# Patient Record
Sex: MALE
[De-identification: names, ages, dates, MRNs, and addresses within clinical notes are randomized per-mention and may not be internally consistent; named-entity substitution may affect disease eponyms.]

---

## 2017-04-20 ENCOUNTER — APPOINTMENT (OUTPATIENT)
Dept: UROLOGY | Facility: CLINIC | Age: 72
End: 2017-04-20

## 2017-04-20 VITALS
WEIGHT: 185 LBS | TEMPERATURE: 97.5 F | BODY MASS INDEX: 25.06 KG/M2 | HEART RATE: 58 BPM | DIASTOLIC BLOOD PRESSURE: 82 MMHG | SYSTOLIC BLOOD PRESSURE: 146 MMHG | HEIGHT: 72 IN

## 2017-06-16 ENCOUNTER — RX RENEWAL (OUTPATIENT)
Age: 72
End: 2017-06-16

## 2017-08-15 ENCOUNTER — RX RENEWAL (OUTPATIENT)
Age: 72
End: 2017-08-15

## 2017-08-15 RX ORDER — NYSTATIN AND TRIAMCINOLONE ACETONIDE 100000; 1 MG/G; MG/G
100000-0.1 CREAM TOPICAL
Qty: 60 | Refills: 0 | Status: COMPLETED | COMMUNITY
Start: 2016-10-26

## 2017-10-17 ENCOUNTER — APPOINTMENT (OUTPATIENT)
Dept: UROLOGY | Facility: CLINIC | Age: 72
End: 2017-10-17
Payer: MEDICARE

## 2017-10-17 VITALS — SYSTOLIC BLOOD PRESSURE: 158 MMHG | DIASTOLIC BLOOD PRESSURE: 79 MMHG | HEART RATE: 54 BPM | TEMPERATURE: 97.3 F

## 2017-10-17 PROCEDURE — 99214 OFFICE O/P EST MOD 30 MIN: CPT

## 2017-10-20 ENCOUNTER — MOBILE ON CALL (OUTPATIENT)
Age: 72
End: 2017-10-20

## 2017-10-21 LAB
BILIRUB UR QL STRIP: NEGATIVE
CLARITY UR: CLEAR
COLLECTION METHOD: NORMAL
GLUCOSE UR-MCNC: NEGATIVE
HCG UR QL: 0.2 EU/DL
HGB UR QL STRIP.AUTO: NORMAL
KETONES UR-MCNC: NEGATIVE
LEUKOCYTE ESTERASE UR QL STRIP: NEGATIVE
NITRITE UR QL STRIP: NEGATIVE
PH UR STRIP: 5
PROT UR STRIP-MCNC: NEGATIVE
SP GR UR STRIP: >=1.03

## 2018-05-01 ENCOUNTER — APPOINTMENT (OUTPATIENT)
Dept: UROLOGY | Facility: CLINIC | Age: 73
End: 2018-05-01
Payer: MEDICARE

## 2018-05-01 VITALS
OXYGEN SATURATION: 99 % | HEART RATE: 88 BPM | HEIGHT: 72 IN | BODY MASS INDEX: 25.73 KG/M2 | WEIGHT: 190 LBS | SYSTOLIC BLOOD PRESSURE: 126 MMHG | DIASTOLIC BLOOD PRESSURE: 69 MMHG

## 2018-05-01 PROCEDURE — 99214 OFFICE O/P EST MOD 30 MIN: CPT

## 2018-05-23 ENCOUNTER — RX RENEWAL (OUTPATIENT)
Age: 73
End: 2018-05-23

## 2018-05-23 ENCOUNTER — MEDICATION RENEWAL (OUTPATIENT)
Age: 73
End: 2018-05-23

## 2018-11-29 ENCOUNTER — APPOINTMENT (OUTPATIENT)
Dept: UROLOGY | Facility: CLINIC | Age: 73
End: 2018-11-29
Payer: MEDICARE

## 2018-11-29 VITALS
HEIGHT: 72 IN | HEART RATE: 58 BPM | WEIGHT: 190 LBS | DIASTOLIC BLOOD PRESSURE: 86 MMHG | SYSTOLIC BLOOD PRESSURE: 152 MMHG | BODY MASS INDEX: 25.73 KG/M2

## 2018-11-29 PROCEDURE — 99214 OFFICE O/P EST MOD 30 MIN: CPT

## 2019-06-19 ENCOUNTER — APPOINTMENT (OUTPATIENT)
Dept: UROLOGY | Facility: CLINIC | Age: 74
End: 2019-06-19
Payer: MEDICARE

## 2019-06-19 VITALS
BODY MASS INDEX: 25.73 KG/M2 | DIASTOLIC BLOOD PRESSURE: 78 MMHG | HEART RATE: 61 BPM | SYSTOLIC BLOOD PRESSURE: 145 MMHG | WEIGHT: 190 LBS | OXYGEN SATURATION: 98 % | HEIGHT: 72 IN

## 2019-06-19 PROCEDURE — 99214 OFFICE O/P EST MOD 30 MIN: CPT

## 2019-06-19 NOTE — HISTORY OF PRESENT ILLNESS
[FreeTextEntry1] : The patient is a 73-year-old gentleman who was seen in the office today for the above. He is presently on tamsulosin one capsule.He discontinued the finasteride and his libido is returned to normal. His daytime frequency Is 5 times a day with nocturia x1. He occasionally has intermittency and difficulty starting his stream but this is tolerable. He denies all other urological and constitutional symptomatology.\par \par His past medical history, surgical history, medication history and allergy history are unchanged.

## 2019-06-19 NOTE — PHYSICAL EXAM
[General Appearance - Well Developed] : well developed [General Appearance - Well Nourished] : well nourished [Normal Appearance] : normal appearance [Well Groomed] : well groomed [General Appearance - In No Acute Distress] : no acute distress [Bowel Sounds] : normal bowel sounds [Abdomen Soft] : soft [Abdomen Tenderness] : non-tender [Abdomen Mass (___ Cm)] : no abdominal mass palpated [Costovertebral Angle Tenderness] : no ~M costovertebral angle tenderness [Anus Abnormality] : the anus and perineum were normal [Prostate Tenderness] : the prostate was not tender [Rectal Exam - Rectum] : digital rectal exam was normal [Prostate Size ___ gm] : prostate size [unfilled] gm [No Prostate Nodules] : no prostate nodules [] : no rash [Oriented To Time, Place, And Person] : oriented to person, place, and time [Edema] : no peripheral edema [Mood] : the mood was normal [Not Anxious] : not anxious [Affect] : the affect was normal [Normal Station and Gait] : the gait and station were normal for the patient's age [No Focal Deficits] : no focal deficits

## 2019-06-19 NOTE — ASSESSMENT
[FreeTextEntry1] : #1) elevated PSA\par 4/11/17 PSA 3.1\par 10/6/17 PSA 3.3\par 4/6/18 PSA 4.5-patient is uncertain if he ejaculated around the time of this blood determination \par 11/5/18 PSA 3.0\par PSA to be done in 11/19- paper work given.\par \par He'll return in 6 months for reevaluation.\par \par #2) symptomatic obstructive BPH: Continue tamsulosin one capsule-refill given;

## 2019-11-19 ENCOUNTER — APPOINTMENT (OUTPATIENT)
Dept: UROLOGY | Facility: CLINIC | Age: 74
End: 2019-11-19
Payer: MEDICARE

## 2019-11-19 VITALS
OXYGEN SATURATION: 99 % | DIASTOLIC BLOOD PRESSURE: 78 MMHG | HEART RATE: 97 BPM | SYSTOLIC BLOOD PRESSURE: 171 MMHG | RESPIRATION RATE: 16 BRPM

## 2019-11-19 PROCEDURE — 99214 OFFICE O/P EST MOD 30 MIN: CPT

## 2019-11-19 NOTE — LETTER BODY
[Dear  ___] : Dear ~NISHANT, [Courtesy Letter:] : I had the pleasure of seeing your patient, [unfilled], in my office today. [Please see my note below.] : Please see my note below. [Sincerely,] : Sincerely, [Consult Closing:] : Thank you very much for allowing me to participate in the care of this patient.  If you have any questions, please do not hesitate to contact me.

## 2019-11-19 NOTE — PHYSICAL EXAM
[General Appearance - Well Nourished] : well nourished [General Appearance - Well Developed] : well developed [Normal Appearance] : normal appearance [Well Groomed] : well groomed [General Appearance - In No Acute Distress] : no acute distress [Abdomen Soft] : soft [Bowel Sounds] : normal bowel sounds [Abdomen Tenderness] : non-tender [Abdomen Mass (___ Cm)] : no abdominal mass palpated [Anus Abnormality] : the anus and perineum were normal [Costovertebral Angle Tenderness] : no ~M costovertebral angle tenderness [Rectal Exam - Rectum] : digital rectal exam was normal [Prostate Tenderness] : the prostate was not tender [No Prostate Nodules] : no prostate nodules [Prostate Size ___ gm] : prostate size [unfilled] gm [] : no rash [Edema] : no peripheral edema [Oriented To Time, Place, And Person] : oriented to person, place, and time [Mood] : the mood was normal [Affect] : the affect was normal [Normal Station and Gait] : the gait and station were normal for the patient's age [Not Anxious] : not anxious [No Focal Deficits] : no focal deficits

## 2019-11-19 NOTE — ASSESSMENT
[FreeTextEntry1] : #1) elevated PSA\par 4/11/17 PSA 3.1\par 10/6/17 PSA 3.3\par 4/6/18 PSA 4.5-patient is uncertain if he ejaculated around the time of this blood determination \par 11/5/18 PSA 3.0\par 11/5/19 PSA 8.0-patient discontinued finasteride prior to this because of decreased libido.\par PSA will be repeated next week and if still in this range, a transperitoneal biopsy will be arranged with Dr. Tai Salas.He was requested to call the office for the results.\par \par He'll return in 6 months with a PSA for reevaluation.\par \par #2) symptomatic obstructive BPH:substitute alfuzosin for tamsulosin.\par \par #3) Erectile dysfunction: Discontinue Cialis Since he does not needed any more. He was told he cannot take it if he takes alfuzosin.

## 2019-12-03 ENCOUNTER — TRANSCRIPTION ENCOUNTER (OUTPATIENT)
Age: 74
End: 2019-12-03

## 2020-01-08 ENCOUNTER — APPOINTMENT (OUTPATIENT)
Dept: UROLOGY | Facility: CLINIC | Age: 75
End: 2020-01-08
Payer: MEDICARE

## 2020-01-08 PROCEDURE — 99214 OFFICE O/P EST MOD 30 MIN: CPT

## 2020-01-08 NOTE — HISTORY OF PRESENT ILLNESS
[FreeTextEntry1] : The patient is a 74-year-old gentleman who was seen with his wife noticed today for the above. He has no new urological or constitutional symptomatology.\par \par Past medical history, surgical history, medication history and allergy history are unchanged.

## 2020-01-08 NOTE — ASSESSMENT
[FreeTextEntry1] : #1) elevated PSA\par 4/11/17 PSA 3.1\par 10/6/17 PSA 3.3\par 4/6/18 PSA 4.5-patient is uncertain if he ejaculated around the time of this blood determination \par 11/5/18 PSA 3.0\par 11/5/19 PSA 8.0-patient discontinued finasteride prior to this because of decreased libido.\par 12/13/19 PSA 7.0\par The patient was referred to Dr. Tai Salas for transperineal prostate biopsy.\par \par #2) symptomatic obstructive BPH:substitute alfuzosin for tamsulosin.\par \par #3) Erectile dysfunction: Discontinue Cialis Since he does not needed any more. He was told he cannot take it if he takes alfuzosin.

## 2020-01-08 NOTE — PHYSICAL EXAM
[General Appearance - Well Developed] : well developed [General Appearance - Well Nourished] : well nourished [General Appearance - In No Acute Distress] : no acute distress [Well Groomed] : well groomed [Normal Appearance] : normal appearance [] : no rash [Oriented To Time, Place, And Person] : oriented to person, place, and time [Affect] : the affect was normal [Mood] : the mood was normal [Normal Station and Gait] : the gait and station were normal for the patient's age [Not Anxious] : not anxious [No Focal Deficits] : no focal deficits

## 2020-01-28 ENCOUNTER — APPOINTMENT (OUTPATIENT)
Dept: UROLOGY | Facility: CLINIC | Age: 75
End: 2020-01-28
Payer: MEDICARE

## 2020-01-28 VITALS
HEIGHT: 72 IN | BODY MASS INDEX: 23.3 KG/M2 | HEART RATE: 71 BPM | SYSTOLIC BLOOD PRESSURE: 171 MMHG | TEMPERATURE: 97.9 F | WEIGHT: 172 LBS | DIASTOLIC BLOOD PRESSURE: 79 MMHG

## 2020-01-28 PROCEDURE — 99214 OFFICE O/P EST MOD 30 MIN: CPT

## 2020-01-30 ENCOUNTER — FORM ENCOUNTER (OUTPATIENT)
Age: 75
End: 2020-01-30

## 2020-01-31 ENCOUNTER — APPOINTMENT (OUTPATIENT)
Dept: UROLOGY | Facility: CLINIC | Age: 75
End: 2020-01-31

## 2020-01-31 ENCOUNTER — APPOINTMENT (OUTPATIENT)
Dept: MRI IMAGING | Facility: CLINIC | Age: 75
End: 2020-01-31
Payer: MEDICARE

## 2020-01-31 ENCOUNTER — OUTPATIENT (OUTPATIENT)
Dept: OUTPATIENT SERVICES | Facility: HOSPITAL | Age: 75
LOS: 1 days | End: 2020-01-31
Payer: MEDICARE

## 2020-01-31 DIAGNOSIS — R97.20 ELEVATED PROSTATE SPECIFIC ANTIGEN [PSA]: ICD-10-CM

## 2020-01-31 PROCEDURE — 72197 MRI PELVIS W/O & W/DYE: CPT | Mod: 26

## 2020-01-31 PROCEDURE — A9585: CPT

## 2020-01-31 PROCEDURE — 72197 MRI PELVIS W/O & W/DYE: CPT

## 2020-06-24 ENCOUNTER — RX RENEWAL (OUTPATIENT)
Age: 75
End: 2020-06-24

## 2020-07-29 ENCOUNTER — APPOINTMENT (OUTPATIENT)
Dept: UROLOGY | Facility: CLINIC | Age: 75
End: 2020-07-29
Payer: MEDICARE

## 2020-07-29 VITALS
BODY MASS INDEX: 23.57 KG/M2 | HEART RATE: 66 BPM | DIASTOLIC BLOOD PRESSURE: 85 MMHG | HEIGHT: 72 IN | SYSTOLIC BLOOD PRESSURE: 152 MMHG | WEIGHT: 174 LBS

## 2020-07-29 VITALS — TEMPERATURE: 97.6 F

## 2020-07-29 PROCEDURE — 99214 OFFICE O/P EST MOD 30 MIN: CPT

## 2020-07-29 NOTE — ASSESSMENT
[FreeTextEntry1] : #1) elevated PSA\par 4/11/17 PSA 3.1\par 10/6/17 PSA 3.3\par 4/6/18 PSA 4.5-patient is uncertain if he ejaculated around the time of this blood determination \par 11/5/18 PSA 3.0\par 11/5/19 PSA 8.0-patient discontinued finasteride prior to this because of decreased libido.\par 12/13/19 PSA 7.0\par The patient was referred to Dr. Tai Salas for transperineal prostate biopsy. Dr. Salas ordered an MRI of the prostate and this was read as a PIRADS 2 so he did not recommend biopsy at this time and just suggested continuing with the PSA screening.\par 7/6/20 PSA 7.9; percent free PSA 14%;prostate measured 74 cc on MRI On 1/31/20\par \par #2) symptomatic obstructive BPH:substitute alfuzosin for tamsulosin.\par \par #3) Erectile dysfunction: Discontinue Cialis Since he does not needed any more. He was told he cannot take it if he takes alfuzosin.

## 2020-07-29 NOTE — PHYSICAL EXAM
[General Appearance - Well Developed] : well developed [General Appearance - Well Nourished] : well nourished [Normal Appearance] : normal appearance [Well Groomed] : well groomed [] : no rash [General Appearance - In No Acute Distress] : no acute distress [Oriented To Time, Place, And Person] : oriented to person, place, and time [Affect] : the affect was normal [Mood] : the mood was normal [Not Anxious] : not anxious [Normal Station and Gait] : the gait and station were normal for the patient's age [No Focal Deficits] : no focal deficits

## 2021-01-27 ENCOUNTER — APPOINTMENT (OUTPATIENT)
Dept: UROLOGY | Facility: CLINIC | Age: 76
End: 2021-01-27
Payer: MEDICARE

## 2021-01-27 VITALS — TEMPERATURE: 97.3 F

## 2021-01-27 PROCEDURE — 99213 OFFICE O/P EST LOW 20 MIN: CPT

## 2021-01-27 NOTE — HISTORY OF PRESENT ILLNESS
[FreeTextEntry1] : The patient is a 74-year-old gentleman who was seen with his wife noticed today for the above. He is presently on alfuzosin only.  His daytime frequency is 8 times a day with nocturia x1 and he denies all other urological and constitutional symptomatology.  He no longer needs medication for erectile dysfunction.  He has no new urological or constitutional symptomatology.\par \par Past medical history, surgical history, medication history and allergy history are unchanged.

## 2021-01-27 NOTE — ASSESSMENT
[FreeTextEntry1] : #1) elevated PSA\par 4/11/17 PSA 3.1\par 10/6/17 PSA 3.3\par 4/6/18 PSA 4.5-patient is uncertain if he ejaculated around the time of this blood determination \par 11/5/18 PSA 3.0\par 11/5/19 PSA 8.0-patient discontinued finasteride prior to this because of decreased libido.\par 12/13/19 PSA 7.0\par The patient was referred to Dr. Tai Salas for transperineal prostate biopsy. Dr. Salas ordered an MRI of the prostate and this was read as a PIRADS 2 so he did not recommend biopsy at this time and just suggested continuing with the PSA screening.\par 7/6/20 PSA 7.9; percent free PSA 14%;prostate measured 74 cc on MRI On 1/31/20\par 1/19/2021 PSA 8.4; percent free PSA 20%; PSA density 0.11\par RTO 6 months with PSA\par \par #2) symptomatic obstructive BPH:substitute alfuzosin for tamsulosin.\par \par #3) Erectile dysfunction: Discontinue Cialis Since he does not needed any more.

## 2021-01-27 NOTE — PHYSICAL EXAM
[General Appearance - Well Developed] : well developed [General Appearance - Well Nourished] : well nourished [Normal Appearance] : normal appearance [Well Groomed] : well groomed [General Appearance - In No Acute Distress] : no acute distress [Bowel Sounds] : normal bowel sounds [Abdomen Soft] : soft [Abdomen Tenderness] : non-tender [Abdomen Mass (___ Cm)] : no abdominal mass palpated [Costovertebral Angle Tenderness] : no ~M costovertebral angle tenderness [Anus Abnormality] : the anus and perineum were normal [Prostate Tenderness] : the prostate was not tender [Rectal Exam - Rectum] : digital rectal exam was normal [No Prostate Nodules] : no prostate nodules [Prostate Size ___ gm] : prostate size [unfilled] gm [] : no rash [Edema] : no peripheral edema [Oriented To Time, Place, And Person] : oriented to person, place, and time [Affect] : the affect was normal [Mood] : the mood was normal [Not Anxious] : not anxious [Normal Station and Gait] : the gait and station were normal for the patient's age [No Focal Deficits] : no focal deficits

## 2021-08-17 ENCOUNTER — NON-APPOINTMENT (OUTPATIENT)
Age: 76
End: 2021-08-17

## 2021-09-15 ENCOUNTER — APPOINTMENT (OUTPATIENT)
Dept: UROLOGY | Facility: CLINIC | Age: 76
End: 2021-09-15

## 2021-09-15 NOTE — ASSESSMENT
[FreeTextEntry1] : #1) elevated PSA\par 4/11/17 PSA 3.1\par 10/6/17 PSA 3.3\par 4/6/18 PSA 4.5-patient is uncertain if he ejaculated around the time of this blood determination \par 11/5/18 PSA 3.0\par 11/5/19 PSA 8.0-patient discontinued finasteride prior to this because of decreased libido.\par 12/13/19 PSA 7.0\par The patient was referred to Dr. Tai Salas for transperineal prostate biopsy. Dr. Salas ordered an MRI of the prostate and this was read as a PIRADS 2 so he did not recommend biopsy at this time and just suggested continuing with the PSA screening.\par 7/6/20 PSA 7.9; percent free PSA 14%;prostate measured 74 cc on MRI On 1/31/20\par 1/19/2021 PSA 8.4; percent free PSA 20%; PSA density 0.11\par 8/9/2021 PSA 2.8-suspect this was a mistake.\par Repeat PSA next week-patient instructed to call the office for the result.\par RTO 6 months with PSA\par \par #2) symptomatic obstructive BPH:substitute alfuzosin for tamsulosin.\par \par #3) Erectile dysfunction: Discontinue Cialis Since he does not needed any more.

## 2021-09-15 NOTE — PHYSICAL EXAM
[General Appearance - Well Developed] : well developed [General Appearance - Well Nourished] : well nourished [Normal Appearance] : normal appearance [Well Groomed] : well groomed [General Appearance - In No Acute Distress] : no acute distress [Bowel Sounds] : normal bowel sounds [Abdomen Soft] : soft [Abdomen Tenderness] : non-tender [Abdomen Mass (___ Cm)] : no abdominal mass palpated [Costovertebral Angle Tenderness] : no ~M costovertebral angle tenderness [Anus Abnormality] : the anus and perineum were normal [Rectal Exam - Rectum] : digital rectal exam was normal [Prostate Tenderness] : the prostate was not tender [No Prostate Nodules] : no prostate nodules [Prostate Size ___ gm] : prostate size [unfilled] gm [] : no rash [Edema] : no peripheral edema [Oriented To Time, Place, And Person] : oriented to person, place, and time [Affect] : the affect was normal [Mood] : the mood was normal [Not Anxious] : not anxious [Normal Station and Gait] : the gait and station were normal for the patient's age [No Focal Deficits] : no focal deficits

## 2021-09-22 ENCOUNTER — APPOINTMENT (OUTPATIENT)
Dept: UROLOGY | Facility: CLINIC | Age: 76
End: 2021-09-22
Payer: MEDICARE

## 2021-09-22 VITALS — SYSTOLIC BLOOD PRESSURE: 129 MMHG | HEART RATE: 65 BPM | DIASTOLIC BLOOD PRESSURE: 67 MMHG | TEMPERATURE: 97.9 F

## 2021-09-22 PROCEDURE — 99214 OFFICE O/P EST MOD 30 MIN: CPT

## 2021-09-22 NOTE — ASSESSMENT
[FreeTextEntry1] : #1) elevated PSA\par 4/11/17 PSA 3.1\par 10/6/17 PSA 3.3\par 4/6/18 PSA 4.5-patient is uncertain if he ejaculated around the time of this blood determination \par 11/5/18 PSA 3.0\par 11/5/19 PSA 8.0-patient discontinued finasteride prior to this because of decreased libido.\par 12/13/19 PSA 7.0\par The patient was referred to Dr. Tai Salas for transperineal prostate biopsy. Dr. Salas ordered an MRI of the prostate and this was read as a PIRADS 2 so he did not recommend biopsy at this time and just suggested continuing with the PSA screening.\par 7/6/20 PSA 7.9; percent free PSA 14%;prostate measured 74 cc on MRI On 1/31/20\par 1/19/2021 PSA 8.4; percent free PSA 20%; PSA density 0.11\par 8/9/2021 PSA 2.8\par KISHORE will be repeated next week to confirm that the PSA is in the range of 2.8.  Will call the office for the results.\par RTO 6 months with PSA\par \par #2) symptomatic obstructive BPH:substitute alfuzosin for tamsulosin.\par \par #3) Erectile dysfunction: Discontinue Cialis Since he does not needed any more.

## 2021-10-13 ENCOUNTER — NON-APPOINTMENT (OUTPATIENT)
Age: 76
End: 2021-10-13

## 2022-03-24 ENCOUNTER — APPOINTMENT (OUTPATIENT)
Dept: UROLOGY | Facility: CLINIC | Age: 77
End: 2022-03-24
Payer: MEDICARE

## 2022-03-24 VITALS — HEART RATE: 75 BPM | SYSTOLIC BLOOD PRESSURE: 169 MMHG | TEMPERATURE: 97.7 F | DIASTOLIC BLOOD PRESSURE: 84 MMHG

## 2022-03-24 PROCEDURE — 99214 OFFICE O/P EST MOD 30 MIN: CPT

## 2022-03-24 RX ORDER — FINASTERIDE 5 MG/1
5 TABLET, FILM COATED ORAL DAILY
Qty: 90 | Refills: 2 | Status: DISCONTINUED | COMMUNITY
Start: 2017-10-17 | End: 2022-03-24

## 2022-03-24 RX ORDER — TAMSULOSIN HYDROCHLORIDE 0.4 MG/1
0.4 CAPSULE ORAL
Qty: 90 | Refills: 1 | Status: DISCONTINUED | COMMUNITY
Start: 2017-10-17 | End: 2022-03-24

## 2022-03-24 NOTE — LETTER BODY
[Dear  ___] : Dear  [unfilled], [Consult Letter:] : I had the pleasure of evaluating your patient, [unfilled]. [Please see my note below.] : Please see my note below. [Consult Closing:] : Thank you very much for allowing me to participate in the care of this patient.  If you have any questions, please do not hesitate to contact me. [Sincerely,] : Sincerely, [FreeTextEntry3] : Jackeline Lagunas, \par Genitourinary Medicine\par [facility]\par

## 2022-03-24 NOTE — ASSESSMENT
[FreeTextEntry1] : Elevated PSA:\par PSA is 7.16\par neg prostate biopsy 2020 and MRI PIRADS 2 when PSA was >7\par will recheck PSA in 3 months and prostate exam\par if rising will consider repeating MRI\par \par BPH: c/w alfuzosin\par Discussed cialis 5mg vs tamsulosin double dose\par pt wants to monitor his symptoms at this time. will consider changing if symptoms worsen in future

## 2022-03-24 NOTE — HISTORY OF PRESENT ILLNESS
[FreeTextEntry1] : 76yo M hx of BPH, Elevated PSA s/p neg transperineal biopsy in 2019\par \par Pt is currently taking alfuzosin. He still occasionally wake up 1-2x a night. He reports some difficulty starting his urination after he wakes up to urinate. \par \par 11/5/18 PSA 3.0\par 11/5/19 PSA 8.0-patient discontinued finasteride prior to this because of decreased libido.\par 12/13/19 PSA 7.0\par The patient was referred to Dr. Tai Salas for transperineal prostate biopsy. Dr. Salas ordered an MRI of the prostate and this was read as a PIRADS 2 so he did not recommend biopsy at this time and just suggested continuing with the PSA screening.\par 7/6/20 PSA 7.9; percent free PSA 14%;prostate measured 74 cc on MRI On 1/31/20\par 1/19/2021 PSA 8.4; percent free PSA 20%; PSA density 0.11\par 8/9/2021 PSA 2.8\par 9/2021 PSA 1.9 & 26% free\par PSA 7.16 on 2/2022

## 2022-06-23 ENCOUNTER — APPOINTMENT (OUTPATIENT)
Dept: UROLOGY | Facility: CLINIC | Age: 77
End: 2022-06-23
Payer: MEDICARE

## 2022-06-23 VITALS
TEMPERATURE: 98.1 F | DIASTOLIC BLOOD PRESSURE: 74 MMHG | WEIGHT: 175 LBS | BODY MASS INDEX: 23.7 KG/M2 | SYSTOLIC BLOOD PRESSURE: 134 MMHG | HEIGHT: 72 IN | HEART RATE: 73 BPM

## 2022-06-23 PROCEDURE — 99214 OFFICE O/P EST MOD 30 MIN: CPT

## 2022-06-23 NOTE — PHYSICAL EXAM
[General Appearance - Well Developed] : well developed [General Appearance - Well Nourished] : well nourished [Normal Appearance] : normal appearance [Well Groomed] : well groomed [General Appearance - In No Acute Distress] : no acute distress [Abdomen Soft] : soft [Prostate Tenderness] : the prostate was not tender [No Prostate Nodules] : no prostate nodules [Prostate Size ___ gm] : prostate size [unfilled] gm [Edema] : no peripheral edema [] : no respiratory distress [Respiration, Rhythm And Depth] : normal respiratory rhythm and effort [Exaggerated Use Of Accessory Muscles For Inspiration] : no accessory muscle use [Oriented To Time, Place, And Person] : oriented to person, place, and time [Affect] : the affect was normal [Mood] : the mood was normal [Not Anxious] : not anxious [Normal Station and Gait] : the gait and station were normal for the patient's age [No Focal Deficits] : no focal deficits

## 2022-06-23 NOTE — LETTER BODY
[Dear  ___] : Dear  [unfilled], [Consult Letter:] : I had the pleasure of evaluating your patient, [unfilled]. [Please see my note below.] : Please see my note below. [Consult Closing:] : Thank you very much for allowing me to participate in the care of this patient.  If you have any questions, please do not hesitate to contact me. [Sincerely,] : Sincerely, [FreeTextEntry3] : Jackeline Lagunas, \par Genitourinary Medicine\par [facility]\par

## 2022-06-23 NOTE — HISTORY OF PRESENT ILLNESS
[FreeTextEntry1] : 78yo M hx of BPH, Elevated PSA \par \par Pt is currently taking alfuzosin. He still occasionally wake up 1-2x a night. He reports some difficulty starting his urination after he wakes up to urinate. \par \par 11/5/18 PSA 3.0\par 11/5/19 PSA 8.0-patient discontinued finasteride prior to this because of decreased libido.\par 12/13/19 PSA 7.0\par The patient was referred to Dr. Tai Salas for transperineal prostate biopsy. Dr. Salas ordered an MRI of the prostate and this was read as a PIRADS 2 so he did not recommend biopsy at this time and just suggested continuing with the PSA screening.\par 7/6/20 PSA 7.9; percent free PSA 14%;prostate measured 74 cc on MRI On 1/31/20\par 1/19/2021 PSA 8.4; percent free PSA 20%; PSA density 0.11\par 8/9/2021 PSA 2.8\par 9/2021 PSA 1.9 & 26% free\par PSA 7.16 on 2/2022\par PSA 7.21 on 6/2022

## 2022-06-23 NOTE — ASSESSMENT
[FreeTextEntry1] : Elevated PSA:\par PSA is stable but elevated\par (error on previous note - patient did not have any previous biopsies)\par 1/2020: MRI PIRADS 2 when PSA was >7\par will repeat MRI in 6 months along with PSA\par \par BPH: c/w alfuzosin\par start cialis 5mg daily\par Discussed tamsulosin double dose instead of alfuzosin if symptoms doesn’t improve\par Pt has tried finasteride in past but stopped due to sexual side effects. Also discussed dutasteride\par \par ED:\par 5mg cialis

## 2022-06-27 ENCOUNTER — NON-APPOINTMENT (OUTPATIENT)
Age: 77
End: 2022-06-27

## 2022-11-14 ENCOUNTER — APPOINTMENT (OUTPATIENT)
Dept: MRI IMAGING | Facility: CLINIC | Age: 77
End: 2022-11-14

## 2022-11-14 ENCOUNTER — RESULT REVIEW (OUTPATIENT)
Age: 77
End: 2022-11-14

## 2022-11-14 ENCOUNTER — OUTPATIENT (OUTPATIENT)
Dept: OUTPATIENT SERVICES | Facility: HOSPITAL | Age: 77
LOS: 1 days | End: 2022-11-14
Payer: MEDICARE

## 2022-11-14 DIAGNOSIS — R97.20 ELEVATED PROSTATE SPECIFIC ANTIGEN [PSA]: ICD-10-CM

## 2022-11-14 PROCEDURE — 72197 MRI PELVIS W/O & W/DYE: CPT | Mod: 26,MH

## 2022-11-14 PROCEDURE — 76498P: CUSTOM | Mod: 26,MH

## 2022-11-14 PROCEDURE — 76498 UNLISTED MR PROCEDURE: CPT

## 2022-11-14 PROCEDURE — A9585: CPT

## 2022-11-14 PROCEDURE — 72197 MRI PELVIS W/O & W/DYE: CPT

## 2022-11-21 ENCOUNTER — NON-APPOINTMENT (OUTPATIENT)
Age: 77
End: 2022-11-21

## 2022-11-21 DIAGNOSIS — M17.11 UNILATERAL PRIMARY OSTEOARTHRITIS, RIGHT KNEE: ICD-10-CM

## 2022-11-21 DIAGNOSIS — Z87.438 PERSONAL HISTORY OF OTHER DISEASES OF MALE GENITAL ORGANS: ICD-10-CM

## 2022-11-21 DIAGNOSIS — M25.561 PAIN IN RIGHT KNEE: ICD-10-CM

## 2022-11-28 ENCOUNTER — APPOINTMENT (OUTPATIENT)
Dept: UROLOGY | Facility: CLINIC | Age: 77
End: 2022-11-28

## 2022-11-28 PROCEDURE — 99214 OFFICE O/P EST MOD 30 MIN: CPT

## 2022-11-28 NOTE — ASSESSMENT
[FreeTextEntry1] : Elevated PSA:\par rising PSA\par MRI showed PIRADS 3 lesion. \par recommended prostate biopsy. Pt has seen Dr. Salas in past for PIRADs 2. He prefers to see Dr. Salas for consultation for biopsy. \par \par BPH: c/w alfuzosin\par c/w cialis 5mg daily\par \par ED:\par can double 5mg cialis for ED.

## 2022-11-28 NOTE — LETTER BODY
[Dear  ___] : Dear  [unfilled], [Consult Letter:] : I had the pleasure of evaluating your patient, [unfilled]. [Please see my note below.] : Please see my note below. [Consult Closing:] : Thank you very much for allowing me to participate in the care of this patient.  If you have any questions, please do not hesitate to contact me. [Sincerely,] : Sincerely, [FreeTextEntry3] : Jackeline Lagunas, \par Genitourinary Medicine\par [facility]\par

## 2022-11-28 NOTE — PHYSICAL EXAM
[General Appearance - Well Developed] : well developed [General Appearance - Well Nourished] : well nourished [Normal Appearance] : normal appearance [Well Groomed] : well groomed [General Appearance - In No Acute Distress] : no acute distress [Abdomen Soft] : soft [Prostate Size ___ gm] : prostate size [unfilled] gm [Edema] : no peripheral edema [] : no respiratory distress [Respiration, Rhythm And Depth] : normal respiratory rhythm and effort [Exaggerated Use Of Accessory Muscles For Inspiration] : no accessory muscle use [Oriented To Time, Place, And Person] : oriented to person, place, and time [Affect] : the affect was normal [Mood] : the mood was normal [Not Anxious] : not anxious [Normal Station and Gait] : the gait and station were normal for the patient's age [No Focal Deficits] : no focal deficits

## 2022-11-28 NOTE — HISTORY OF PRESENT ILLNESS
[FreeTextEntry1] : 76yo M hx of BPH, Elevated PSA \par He is taking alfuzosin and cialis 5mg daily. He reports since starting cialis he noticed improvement in urination. \par \par 11/5/18 PSA 3.0\par 11/5/19 PSA 8.0-patient discontinued finasteride prior to this because of decreased libido.\par 12/13/19 PSA 7.0\par The patient was referred to Dr. Tai Salas for transperineal prostate biopsy. Dr. Salas ordered an MRI of the prostate and this was read as a PIRADS 2 so he did not recommend biopsy at this time and just suggested continuing with the PSA screening.\par 7/6/20 PSA 7.9; percent free PSA 14%;prostate measured 74 cc on MRI On 1/31/20\par 1/19/2021 PSA 8.4; percent free PSA 20%; PSA density 0.11\par 8/9/2021 PSA 2.8\par 9/2021 PSA 1.9 & 26% free\par PSA 7.16 on 2/2022\par PSA 7.21 on 6/2022\par PSA 8.2 total & 24% free on 11/2022\par MRI prostate showed one PIRADS 3 lesion.

## 2022-12-14 ENCOUNTER — APPOINTMENT (OUTPATIENT)
Dept: UROLOGY | Facility: CLINIC | Age: 77
End: 2022-12-14

## 2022-12-14 VITALS
HEIGHT: 72 IN | DIASTOLIC BLOOD PRESSURE: 90 MMHG | BODY MASS INDEX: 23.7 KG/M2 | HEART RATE: 71 BPM | TEMPERATURE: 98 F | RESPIRATION RATE: 17 BRPM | SYSTOLIC BLOOD PRESSURE: 162 MMHG | WEIGHT: 175 LBS

## 2022-12-14 PROCEDURE — 99214 OFFICE O/P EST MOD 30 MIN: CPT

## 2022-12-14 RX ORDER — TADALAFIL 20 MG/1
20 TABLET, FILM COATED ORAL
Qty: 6 | Refills: 3 | Status: COMPLETED | COMMUNITY
Start: 2017-10-17 | End: 2022-12-14

## 2022-12-14 RX ORDER — GABAPENTIN 600 MG/1
600 TABLET, COATED ORAL DAILY
Refills: 0 | Status: COMPLETED | COMMUNITY
End: 2022-12-14

## 2022-12-14 RX ORDER — TADALAFIL 20 MG/1
20 TABLET, FILM COATED ORAL
Qty: 6 | Refills: 3 | Status: COMPLETED | COMMUNITY
Start: 2017-04-20 | End: 2022-12-14

## 2022-12-14 RX ORDER — DIAZEPAM 5 MG/1
5 TABLET ORAL
Qty: 1 | Refills: 0 | Status: COMPLETED | COMMUNITY
Start: 2020-01-27 | End: 2022-12-14

## 2022-12-15 ENCOUNTER — OFFICE (OUTPATIENT)
Dept: URBAN - METROPOLITAN AREA CLINIC 12 | Facility: CLINIC | Age: 77
Setting detail: OPHTHALMOLOGY
End: 2022-12-15
Payer: MEDICARE

## 2022-12-15 DIAGNOSIS — H25.12: ICD-10-CM

## 2022-12-15 DIAGNOSIS — H25.13: ICD-10-CM

## 2022-12-15 PROCEDURE — 92136 OPHTHALMIC BIOMETRY: CPT | Performed by: OPHTHALMOLOGY

## 2022-12-15 PROCEDURE — 99213 OFFICE O/P EST LOW 20 MIN: CPT | Performed by: OPHTHALMOLOGY

## 2022-12-15 ASSESSMENT — REFRACTION_CURRENTRX
OD_VPRISM_DIRECTION: SV
OS_OVR_VA: 20/
OD_SPHERE: -1.25
OD_OVR_VA: 20/
OS_SPHERE: -1.00
OS_VPRISM_DIRECTION: SV
OD_CYLINDER: SPHERE
OS_CYLINDER: SPHERE
OD_AXIS: 0
OS_AXIS: 0

## 2022-12-15 ASSESSMENT — SPHEQUIV_DERIVED
OS_SPHEQUIV: -0.625
OD_SPHEQUIV: -0.75
OD_SPHEQUIV: -0.5
OS_SPHEQUIV: -0.625

## 2022-12-15 ASSESSMENT — REFRACTION_AUTOREFRACTION
OD_SPHERE: -0.25
OS_SPHERE: -0.50
OS_AXIS: 033
OD_CYLINDER: -0.50
OS_CYLINDER: -0.25
OD_AXIS: 096

## 2022-12-15 ASSESSMENT — AXIALLENGTH_DERIVED
OD_AL: 23.202
OD_AL: 23.1081
OS_AL: 2.94
OS_AL: 2.94

## 2022-12-15 ASSESSMENT — KERATOMETRY
METHOD_AUTO_MANUAL: AUTO
OS_AXISANGLE_DEGREES: 089
OD_K1POWER_DIOPTERS: 45.25
OS_K2POWER_DIOPTERS: 945.75
OD_K2POWER_DIOPTERS: 45.50
OS_K1POWER_DIOPTERS: 45.54
OD_AXISANGLE_DEGREES: 162

## 2022-12-15 ASSESSMENT — REFRACTION_MANIFEST
OD_AXIS: 075
OD_VA1: 20/NI
OD_CYLINDER: -0.50
OS_VA1: 20/25
OS_SPHERE: -0.50
OS_CYLINDER: -0.25
OD_SPHERE: -0.50
OS_AXIS: 005

## 2022-12-15 ASSESSMENT — TONOMETRY
OD_IOP_MMHG: 14
OS_IOP_MMHG: 17

## 2022-12-15 ASSESSMENT — CONFRONTATIONAL VISUAL FIELD TEST (CVF)
OD_FINDINGS: FULL
OS_FINDINGS: FULL

## 2022-12-15 ASSESSMENT — VISUAL ACUITY
OS_BCVA: 20/30-2
OD_BCVA: 20/25

## 2022-12-16 NOTE — ASSESSMENT
[FreeTextEntry1] : I reviewed the patient's PSA level with him along with his PSA density and prostate MRI findings.  I counseled him that in the setting of having a PI-RADS category 3 lesion and low PSA density, I typically recommend against prostate biopsy in the setting as the likelihood of him harboring clinically significant prostate cancer is actually extremely low.  We reviewed how MRI is not without its error rate and that it is possible he may harbor clinically significant prostate cancer despite what appears to be a relatively low risk profile.  With these ideas in mind, we reviewed the decision of prostate biopsy.  I gave him my recommendation that I did not think it was necessary.  He was strongly in favor of not moving forward with biopsy at this time and is comfortable with this decision.  I would also recommend that he can discontinue future prostate cancer screening.

## 2022-12-16 NOTE — HISTORY OF PRESENT ILLNESS
[FreeTextEntry1] : Jac Galeana returns to the office today.  He is a 77-year-old man here today to consider a possible prostate biopsy.  I had seen him previously regarding a similar decision at the time when he had undergone a prostate MRI showing a PI-RADS category 2 lesion without any clearly suspicious lesions.  He has recently undergone repeat PSA testing and prostate MRI.  His total PSA is 8.2 ng/mL and the free fraction is 24%.  This level was from mid November this year.  Repeat prostate MRI was performed.  This showed a prostate volume of 93 cm³.  His calculated PSA density with this in mind is 0.08.  MRI also shows the presence of a 4 x 5 x 6 mm PI-RADS category 3 lesion at the left posterior medial peripheral zone mid gland to apex.  This prompted his referral here for consideration of prostate biopsy.

## 2023-01-05 ENCOUNTER — OFFICE (OUTPATIENT)
Dept: URBAN - METROPOLITAN AREA CLINIC 12 | Facility: CLINIC | Age: 78
Setting detail: OPHTHALMOLOGY
End: 2023-01-05
Payer: MEDICARE

## 2023-01-05 DIAGNOSIS — Z20.822: ICD-10-CM

## 2023-01-05 DIAGNOSIS — Z01.812: ICD-10-CM

## 2023-01-05 PROCEDURE — 99211 OFF/OP EST MAY X REQ PHY/QHP: CPT | Performed by: OPHTHALMOLOGY

## 2023-01-05 ASSESSMENT — SPHEQUIV_DERIVED
OS_SPHEQUIV: -0.625
OD_SPHEQUIV: -0.75
OS_SPHEQUIV: -0.625
OD_SPHEQUIV: -0.5

## 2023-01-05 ASSESSMENT — VISUAL ACUITY
OS_BCVA: 20/30-2
OD_BCVA: 20/25

## 2023-01-05 ASSESSMENT — KERATOMETRY
OS_K2POWER_DIOPTERS: 945.75
OD_K1POWER_DIOPTERS: 45.25
OD_K2POWER_DIOPTERS: 45.50
OD_AXISANGLE_DEGREES: 162
OS_AXISANGLE_DEGREES: 089
METHOD_AUTO_MANUAL: AUTO
OS_K1POWER_DIOPTERS: 45.54

## 2023-01-05 ASSESSMENT — REFRACTION_AUTOREFRACTION
OD_AXIS: 096
OS_AXIS: 033
OS_CYLINDER: -0.25
OD_CYLINDER: -0.50
OS_SPHERE: -0.50
OD_SPHERE: -0.25

## 2023-01-05 ASSESSMENT — REFRACTION_MANIFEST
OS_SPHERE: -0.50
OD_AXIS: 075
OS_AXIS: 005
OS_CYLINDER: -0.25
OD_VA1: 20/NI
OD_SPHERE: -0.50
OD_CYLINDER: -0.50
OS_VA1: 20/25

## 2023-01-05 ASSESSMENT — REFRACTION_CURRENTRX
OD_CYLINDER: SPHERE
OD_SPHERE: -1.25
OS_OVR_VA: 20/
OS_CYLINDER: SPHERE
OD_VPRISM_DIRECTION: SV
OS_AXIS: 0
OS_VPRISM_DIRECTION: SV
OS_SPHERE: -1.00
OD_AXIS: 0
OD_OVR_VA: 20/

## 2023-01-05 ASSESSMENT — AXIALLENGTH_DERIVED
OS_AL: 2.94
OD_AL: 23.202
OD_AL: 23.1081
OS_AL: 2.94

## 2023-01-10 ENCOUNTER — AMBULATORY SURGERY CENTER (OUTPATIENT)
Dept: URBAN - METROPOLITAN AREA SURGERY 27 | Facility: SURGERY | Age: 78
Setting detail: OPHTHALMOLOGY
End: 2023-01-10
Payer: MEDICARE

## 2023-01-10 DIAGNOSIS — H25.12: ICD-10-CM

## 2023-01-10 DIAGNOSIS — H52.212: ICD-10-CM

## 2023-01-10 PROCEDURE — FEMTO CATARACT LASER: Performed by: OPHTHALMOLOGY

## 2023-01-10 PROCEDURE — A9270 NON-COVERED ITEM OR SERVICE: HCPCS | Performed by: OPHTHALMOLOGY

## 2023-01-10 PROCEDURE — 66984 XCAPSL CTRC RMVL W/O ECP: CPT | Performed by: OPHTHALMOLOGY

## 2023-01-11 ENCOUNTER — RX ONLY (RX ONLY)
Age: 78
End: 2023-01-11

## 2023-01-11 ENCOUNTER — OFFICE (OUTPATIENT)
Dept: URBAN - METROPOLITAN AREA CLINIC 12 | Facility: CLINIC | Age: 78
Setting detail: OPHTHALMOLOGY
End: 2023-01-11
Payer: MEDICARE

## 2023-01-11 DIAGNOSIS — Z96.1: ICD-10-CM

## 2023-01-11 PROCEDURE — 99024 POSTOP FOLLOW-UP VISIT: CPT | Performed by: OPTOMETRIST

## 2023-01-11 ASSESSMENT — AXIALLENGTH_DERIVED
OS_AL: 22.9359
OD_AL: 23.1135
OD_AL: 23.1135
OS_AL: 22.89

## 2023-01-11 ASSESSMENT — REFRACTION_AUTOREFRACTION
OD_CYLINDER: -0.50
OS_AXIS: 83
OS_CYLINDER: -0.50
OS_SPHERE: -0.25
OD_SPHERE: -0.50
OD_AXIS: 78

## 2023-01-11 ASSESSMENT — CONFRONTATIONAL VISUAL FIELD TEST (CVF)
OD_FINDINGS: FULL
OS_FINDINGS: FULL

## 2023-01-11 ASSESSMENT — SPHEQUIV_DERIVED
OD_SPHEQUIV: -0.75
OD_SPHEQUIV: -0.75
OS_SPHEQUIV: -0.625
OS_SPHEQUIV: -0.5

## 2023-01-11 ASSESSMENT — REFRACTION_MANIFEST
OD_CYLINDER: -0.50
OD_AXIS: 075
OS_AXIS: 005
OD_SPHERE: -0.50
OD_VA1: 20/NI
OS_CYLINDER: -0.25
OS_SPHERE: -0.50
OS_VA1: 20/25

## 2023-01-11 ASSESSMENT — KERATOMETRY
OD_K2POWER_DIOPTERS: 45.75
OD_K1POWER_DIOPTERS: 45.50
OS_AXISANGLE_DEGREES: 63
OS_K2POWER_DIOPTERS: 46.25
OS_K1POWER_DIOPTERS: 45.75
OD_AXISANGLE_DEGREES: 96
METHOD_AUTO_MANUAL: AUTO

## 2023-01-11 ASSESSMENT — REFRACTION_CURRENTRX
OS_OVR_VA: 20/
OD_SPHERE: -1.25
OD_CYLINDER: SPHERE
OS_VPRISM_DIRECTION: SV
OD_AXIS: 0
OD_VPRISM_DIRECTION: SV
OS_SPHERE: -1.00
OS_AXIS: 0
OD_OVR_VA: 20/
OS_CYLINDER: SPHERE

## 2023-01-11 ASSESSMENT — VISUAL ACUITY
OS_BCVA: 20/30-2
OD_BCVA: 20/20-1

## 2023-01-11 ASSESSMENT — TONOMETRY
OS_IOP_MMHG: 20
OD_IOP_MMHG: 14

## 2023-01-16 ENCOUNTER — OFFICE (OUTPATIENT)
Dept: URBAN - METROPOLITAN AREA CLINIC 12 | Facility: CLINIC | Age: 78
Setting detail: OPHTHALMOLOGY
End: 2023-01-16
Payer: MEDICARE

## 2023-01-16 DIAGNOSIS — H25.11: ICD-10-CM

## 2023-01-16 PROCEDURE — 92136 OPHTHALMIC BIOMETRY: CPT | Performed by: OPHTHALMOLOGY

## 2023-01-16 ASSESSMENT — SPHEQUIV_DERIVED
OD_SPHEQUIV: -0.625
OS_SPHEQUIV: -0.625
OD_SPHEQUIV: -0.75

## 2023-01-16 ASSESSMENT — TONOMETRY
OD_IOP_MMHG: 16
OS_IOP_MMHG: 15

## 2023-01-16 ASSESSMENT — REFRACTION_AUTOREFRACTION
OD_AXIS: 069
OD_SPHERE: -0.50
OS_SPHERE: -0.50
OD_CYLINDER: -0.25
OS_CYLINDER: SPHERE
OS_AXIS: 000

## 2023-01-16 ASSESSMENT — REFRACTION_CURRENTRX
OS_CYLINDER: SPHERE
OD_VPRISM_DIRECTION: SV
OD_OVR_VA: 20/
OD_SPHERE: -1.25
OS_SPHERE: -1.00
OS_VPRISM_DIRECTION: SV
OD_AXIS: 0
OS_OVR_VA: 20/
OD_CYLINDER: SPHERE
OS_AXIS: 0

## 2023-01-16 ASSESSMENT — REFRACTION_MANIFEST
OD_VA1: 20/NI
OS_SPHERE: -0.50
OS_CYLINDER: -0.25
OD_CYLINDER: -0.50
OD_AXIS: 075
OS_VA1: 20/25
OS_AXIS: 005
OD_SPHERE: -0.50

## 2023-01-16 ASSESSMENT — KERATOMETRY
OS_AXISANGLE_DEGREES: 157
METHOD_AUTO_MANUAL: AUTO
OS_K2POWER_DIOPTERS: 45.75
OS_K1POWER_DIOPTERS: 45.25
OD_AXISANGLE_DEGREES: 103
OD_K1POWER_DIOPTERS: 45.50
OD_K2POWER_DIOPTERS: 46.00

## 2023-01-16 ASSESSMENT — CONFRONTATIONAL VISUAL FIELD TEST (CVF)
OD_FINDINGS: FULL
OS_FINDINGS: FULL

## 2023-01-16 ASSESSMENT — AXIALLENGTH_DERIVED
OD_AL: 23.0695
OD_AL: 23.023
OS_AL: 23.1108

## 2023-01-16 ASSESSMENT — VISUAL ACUITY
OS_BCVA: 20/50-2
OD_BCVA: 20/20

## 2023-01-19 ENCOUNTER — OFFICE (OUTPATIENT)
Dept: URBAN - METROPOLITAN AREA CLINIC 12 | Facility: CLINIC | Age: 78
Setting detail: OPHTHALMOLOGY
End: 2023-01-19
Payer: MEDICARE

## 2023-01-19 DIAGNOSIS — Z20.822: ICD-10-CM

## 2023-01-19 DIAGNOSIS — Z01.812: ICD-10-CM

## 2023-01-19 PROCEDURE — 99211 OFF/OP EST MAY X REQ PHY/QHP: CPT | Performed by: OPHTHALMOLOGY

## 2023-01-19 ASSESSMENT — REFRACTION_MANIFEST
OD_CYLINDER: -0.50
OS_AXIS: 005
OD_AXIS: 075
OS_CYLINDER: -0.25
OS_VA1: 20/25
OD_VA1: 20/NI
OD_SPHERE: -0.50
OS_SPHERE: -0.50

## 2023-01-19 ASSESSMENT — REFRACTION_AUTOREFRACTION
OS_AXIS: 000
OS_SPHERE: -0.50
OS_CYLINDER: SPHERE
OD_SPHERE: -0.50
OD_CYLINDER: -0.25
OD_AXIS: 069

## 2023-01-19 ASSESSMENT — REFRACTION_CURRENTRX
OS_SPHERE: -1.00
OS_AXIS: 0
OD_OVR_VA: 20/
OS_VPRISM_DIRECTION: SV
OD_SPHERE: -1.25
OD_CYLINDER: SPHERE
OS_CYLINDER: SPHERE
OS_OVR_VA: 20/
OD_AXIS: 0
OD_VPRISM_DIRECTION: SV

## 2023-01-19 ASSESSMENT — AXIALLENGTH_DERIVED
OD_AL: 23.023
OS_AL: 23.1108
OD_AL: 23.0695

## 2023-01-19 ASSESSMENT — KERATOMETRY
OD_K2POWER_DIOPTERS: 46.00
OS_K2POWER_DIOPTERS: 45.75
OS_K1POWER_DIOPTERS: 45.25
OD_K1POWER_DIOPTERS: 45.50
OD_AXISANGLE_DEGREES: 103
OS_AXISANGLE_DEGREES: 157
METHOD_AUTO_MANUAL: AUTO

## 2023-01-19 ASSESSMENT — SPHEQUIV_DERIVED
OD_SPHEQUIV: -0.625
OS_SPHEQUIV: -0.625
OD_SPHEQUIV: -0.75

## 2023-01-19 ASSESSMENT — VISUAL ACUITY
OS_BCVA: 20/50-2
OD_BCVA: 20/20

## 2023-01-26 ENCOUNTER — OFFICE (OUTPATIENT)
Dept: URBAN - METROPOLITAN AREA CLINIC 12 | Facility: CLINIC | Age: 78
Setting detail: OPHTHALMOLOGY
End: 2023-01-26
Payer: MEDICARE

## 2023-01-26 DIAGNOSIS — Z01.812: ICD-10-CM

## 2023-01-26 DIAGNOSIS — Z20.822: ICD-10-CM

## 2023-01-26 PROCEDURE — 99211 OFF/OP EST MAY X REQ PHY/QHP: CPT | Performed by: OPHTHALMOLOGY

## 2023-01-26 ASSESSMENT — KERATOMETRY
METHOD_AUTO_MANUAL: AUTO
OS_K1POWER_DIOPTERS: 45.25
OS_K2POWER_DIOPTERS: 45.75
OS_AXISANGLE_DEGREES: 157
OD_AXISANGLE_DEGREES: 103
OD_K2POWER_DIOPTERS: 46.00
OD_K1POWER_DIOPTERS: 45.50

## 2023-01-26 ASSESSMENT — SPHEQUIV_DERIVED
OD_SPHEQUIV: -0.625
OD_SPHEQUIV: -0.75
OS_SPHEQUIV: -0.625

## 2023-01-26 ASSESSMENT — AXIALLENGTH_DERIVED
OD_AL: 23.023
OS_AL: 23.1108
OD_AL: 23.0695

## 2023-01-26 ASSESSMENT — REFRACTION_AUTOREFRACTION
OD_SPHERE: -0.50
OS_SPHERE: -0.50
OS_CYLINDER: SPHERE
OD_AXIS: 069
OD_CYLINDER: -0.25
OS_AXIS: 000

## 2023-01-26 ASSESSMENT — REFRACTION_CURRENTRX
OD_AXIS: 0
OS_SPHERE: -1.00
OD_SPHERE: -1.25
OD_VPRISM_DIRECTION: SV
OS_AXIS: 0
OS_CYLINDER: SPHERE
OS_OVR_VA: 20/
OD_OVR_VA: 20/
OD_CYLINDER: SPHERE
OS_VPRISM_DIRECTION: SV

## 2023-01-26 ASSESSMENT — REFRACTION_MANIFEST
OS_CYLINDER: -0.25
OD_AXIS: 075
OD_VA1: 20/NI
OS_AXIS: 005
OD_SPHERE: -0.50
OD_CYLINDER: -0.50
OS_SPHERE: -0.50
OS_VA1: 20/25

## 2023-01-26 ASSESSMENT — VISUAL ACUITY
OD_BCVA: 20/20
OS_BCVA: 20/50-2

## 2023-01-31 ENCOUNTER — AMBULATORY SURGERY CENTER (OUTPATIENT)
Dept: URBAN - METROPOLITAN AREA SURGERY 27 | Facility: SURGERY | Age: 78
Setting detail: OPHTHALMOLOGY
End: 2023-01-31
Payer: MEDICARE

## 2023-01-31 DIAGNOSIS — H25.11: ICD-10-CM

## 2023-01-31 DIAGNOSIS — H52.211: ICD-10-CM

## 2023-01-31 PROCEDURE — FEMTO CATARACT LASER: Performed by: OPHTHALMOLOGY

## 2023-01-31 PROCEDURE — A9270 NON-COVERED ITEM OR SERVICE: HCPCS | Performed by: OPHTHALMOLOGY

## 2023-01-31 PROCEDURE — 66984 XCAPSL CTRC RMVL W/O ECP: CPT | Performed by: OPHTHALMOLOGY

## 2023-02-01 ENCOUNTER — OFFICE (OUTPATIENT)
Dept: URBAN - METROPOLITAN AREA CLINIC 12 | Facility: CLINIC | Age: 78
Setting detail: OPHTHALMOLOGY
End: 2023-02-01
Payer: MEDICARE

## 2023-02-01 DIAGNOSIS — Z96.1: ICD-10-CM

## 2023-02-01 PROCEDURE — 99024 POSTOP FOLLOW-UP VISIT: CPT | Performed by: OPTOMETRIST

## 2023-02-01 ASSESSMENT — KERATOMETRY
OS_AXISANGLE_DEGREES: 080
OD_K1POWER_DIOPTERS: 45.00
OS_K2POWER_DIOPTERS: 46.25
OD_AXISANGLE_DEGREES: 088
OS_K1POWER_DIOPTERS: 45.50
METHOD_AUTO_MANUAL: AUTO

## 2023-02-01 ASSESSMENT — REFRACTION_CURRENTRX
OD_CYLINDER: SPHERE
OS_OVR_VA: 20/
OS_SPHERE: -1.00
OS_VPRISM_DIRECTION: SV
OS_CYLINDER: SPHERE
OD_SPHERE: -1.25
OD_VPRISM_DIRECTION: SV
OS_AXIS: 0
OD_OVR_VA: 20/
OD_AXIS: 0

## 2023-02-01 ASSESSMENT — REFRACTION_AUTOREFRACTION
OD_CYLINDER: -0.50
OS_SPHERE: -0.25
OD_SPHERE: +0.50
OD_AXIS: 003
OS_AXIS: 012
OS_CYLINDER: -0.25

## 2023-02-01 ASSESSMENT — VISUAL ACUITY
OD_BCVA: 20/20-2
OS_BCVA: 20/20

## 2023-02-01 ASSESSMENT — SPHEQUIV_DERIVED
OD_SPHEQUIV: -0.75
OD_SPHEQUIV: 0.25
OS_SPHEQUIV: -0.375
OS_SPHEQUIV: -0.625

## 2023-02-01 ASSESSMENT — REFRACTION_MANIFEST
OS_CYLINDER: -0.25
OD_AXIS: 075
OD_VA1: 20/NI
OS_VA1: 20/25
OS_SPHERE: -0.50
OD_SPHERE: -0.50
OD_CYLINDER: -0.50
OS_AXIS: 005

## 2023-02-01 ASSESSMENT — CONFRONTATIONAL VISUAL FIELD TEST (CVF)
OS_FINDINGS: FULL
OD_FINDINGS: FULL

## 2023-02-01 ASSESSMENT — AXIALLENGTH_DERIVED
OS_AL: 22.8873
OS_AL: 22.9794

## 2023-02-01 ASSESSMENT — TONOMETRY: OS_IOP_MMHG: 14

## 2023-02-02 ENCOUNTER — OFFICE (OUTPATIENT)
Dept: URBAN - METROPOLITAN AREA CLINIC 12 | Facility: CLINIC | Age: 78
Setting detail: OPHTHALMOLOGY
End: 2023-02-02
Payer: MEDICARE

## 2023-02-02 ENCOUNTER — RX ONLY (RX ONLY)
Age: 78
End: 2023-02-02

## 2023-02-02 DIAGNOSIS — Z96.1: ICD-10-CM

## 2023-02-02 PROCEDURE — 99024 POSTOP FOLLOW-UP VISIT: CPT | Performed by: OPHTHALMOLOGY

## 2023-02-02 ASSESSMENT — AXIALLENGTH_DERIVED
OD_AL: 23.1576
OS_AL: 22.9359
OS_AL: 22.89
OD_AL: 22.8338

## 2023-02-02 ASSESSMENT — REFRACTION_AUTOREFRACTION
OS_CYLINDER: -0.50
OS_AXIS: 006
OD_SPHERE: +0.25
OD_CYLINDER: -0.25
OS_SPHERE: -0.25
OD_AXIS: 053

## 2023-02-02 ASSESSMENT — SPHEQUIV_DERIVED
OS_SPHEQUIV: -0.625
OD_SPHEQUIV: -0.75
OD_SPHEQUIV: 0.125
OS_SPHEQUIV: -0.5

## 2023-02-02 ASSESSMENT — KERATOMETRY
OD_K1POWER_DIOPTERS: 45.00
OD_K2POWER_DIOPTERS: 46.00
OS_K2POWER_DIOPTERS: 46.50
OD_AXISANGLE_DEGREES: 104
OS_AXISANGLE_DEGREES: 101
METHOD_AUTO_MANUAL: AUTO
OS_K1POWER_DIOPTERS: 45.50

## 2023-02-02 ASSESSMENT — REFRACTION_MANIFEST
OS_VA1: 20/25
OS_SPHERE: -0.50
OS_AXIS: 005
OD_CYLINDER: -0.50
OD_SPHERE: -0.50
OD_AXIS: 075
OD_VA1: 20/NI
OS_CYLINDER: -0.25

## 2023-02-02 ASSESSMENT — TONOMETRY
OD_IOP_MMHG: 14
OS_IOP_MMHG: 12

## 2023-02-02 ASSESSMENT — REFRACTION_CURRENTRX
OS_VPRISM_DIRECTION: SV
OD_VPRISM_DIRECTION: SV
OS_OVR_VA: 20/
OD_OVR_VA: 20/
OD_AXIS: 0
OS_CYLINDER: SPHERE
OS_SPHERE: -1.00
OD_SPHERE: -1.25
OS_AXIS: 0
OD_CYLINDER: SPHERE

## 2023-02-02 ASSESSMENT — VISUAL ACUITY
OS_BCVA: 20/20
OD_BCVA: 20/20-2

## 2023-02-02 ASSESSMENT — CONFRONTATIONAL VISUAL FIELD TEST (CVF)
OD_FINDINGS: FULL
OS_FINDINGS: FULL

## 2023-02-07 ENCOUNTER — OFFICE (OUTPATIENT)
Dept: URBAN - METROPOLITAN AREA CLINIC 12 | Facility: CLINIC | Age: 78
Setting detail: OPHTHALMOLOGY
End: 2023-02-07
Payer: MEDICARE

## 2023-02-07 DIAGNOSIS — Z96.1: ICD-10-CM

## 2023-02-07 PROCEDURE — 99024 POSTOP FOLLOW-UP VISIT: CPT | Performed by: OPTOMETRIST

## 2023-02-07 ASSESSMENT — REFRACTION_CURRENTRX
OD_CYLINDER: SPHERE
OD_OVR_VA: 20/
OS_VPRISM_DIRECTION: SV
OS_OVR_VA: 20/
OS_OVR_VA: 20/
OD_VPRISM_DIRECTION: SV
OD_OVR_VA: 20/
OS_VPRISM_DIRECTION: SV
OS_SPHERE: -1.00
OS_CYLINDER: SPHERE
OS_AXIS: 0
OD_AXIS: 0
OD_VPRISM_DIRECTION: SV
OD_SPHERE: -1.25

## 2023-02-07 ASSESSMENT — KERATOMETRY
METHOD_AUTO_MANUAL: AUTO
OD_AXISANGLE_DEGREES: 100
OS_K1POWER_DIOPTERS: 45.50
OS_K2POWER_DIOPTERS: 46.00
OD_K2POWER_DIOPTERS: 46.25
OD_K1POWER_DIOPTERS: 45.25
OS_AXISANGLE_DEGREES: 006

## 2023-02-07 ASSESSMENT — REFRACTION_MANIFEST
OS_CYLINDER: -0.25
OS_SPHERE: -0.50
OD_VA1: 20/NI
OD_CYLINDER: -0.50
OS_AXIS: 005
OD_SPHERE: -0.50
OD_AXIS: 075
OS_VA1: 20/25

## 2023-02-07 ASSESSMENT — AXIALLENGTH_DERIVED
OD_AL: 23.0695
OS_AL: 22.9306
OD_AL: 22.9767
OS_AL: 23.023

## 2023-02-07 ASSESSMENT — REFRACTION_AUTOREFRACTION
OS_AXIS: 013
OS_SPHERE: -0.25
OD_CYLINDER: -0.50
OS_CYLINDER: -0.25
OD_AXIS: 016
OD_SPHERE: -0.25

## 2023-02-07 ASSESSMENT — VISUAL ACUITY
OS_BCVA: 20/20
OD_BCVA: 20/20

## 2023-02-07 ASSESSMENT — TONOMETRY
OS_IOP_MMHG: 15
OD_IOP_MMHG: 18

## 2023-02-07 ASSESSMENT — SPHEQUIV_DERIVED
OD_SPHEQUIV: -0.5
OD_SPHEQUIV: -0.75
OS_SPHEQUIV: -0.625
OS_SPHEQUIV: -0.375

## 2023-02-07 ASSESSMENT — CONFRONTATIONAL VISUAL FIELD TEST (CVF)
OS_FINDINGS: FULL
OD_FINDINGS: FULL

## 2023-02-21 ENCOUNTER — OFFICE (OUTPATIENT)
Dept: URBAN - METROPOLITAN AREA CLINIC 12 | Facility: CLINIC | Age: 78
Setting detail: OPHTHALMOLOGY
End: 2023-02-21
Payer: MEDICARE

## 2023-02-21 DIAGNOSIS — Z96.1: ICD-10-CM

## 2023-02-21 DIAGNOSIS — H02.401: ICD-10-CM

## 2023-02-21 PROCEDURE — 99024 POSTOP FOLLOW-UP VISIT: CPT | Performed by: OPTOMETRIST

## 2023-02-21 ASSESSMENT — REFRACTION_CURRENTRX
OD_CYLINDER: SPHERE
OD_VPRISM_DIRECTION: SV
OD_AXIS: 0
OS_VPRISM_DIRECTION: SV
OD_OVR_VA: 20/
OD_OVR_VA: 20/
OS_CYLINDER: SPHERE
OS_VPRISM_DIRECTION: SV
OS_OVR_VA: 20/
OD_VPRISM_DIRECTION: SV
OS_OVR_VA: 20/
OS_AXIS: 0
OD_SPHERE: -1.25
OS_SPHERE: -1.00

## 2023-02-21 ASSESSMENT — SPHEQUIV_DERIVED
OS_SPHEQUIV: -0.625
OD_SPHEQUIV: -0.75
OD_SPHEQUIV: -0.75

## 2023-02-21 ASSESSMENT — VISUAL ACUITY
OS_BCVA: 20/20-2
OD_BCVA: 20/20

## 2023-02-21 ASSESSMENT — REFRACTION_MANIFEST
OS_CYLINDER: -0.25
OS_VA1: 20/25
OS_AXIS: 005
OD_SPHERE: -0.50
OD_CYLINDER: -0.50
OD_AXIS: 075
OD_VA1: 20/NI
OS_SPHERE: -0.50

## 2023-02-21 ASSESSMENT — KERATOMETRY
OD_K1POWER_DIOPTERS: 45.25
OS_AXISANGLE_DEGREES: 088
OD_K2POWER_DIOPTERS: 46.00
OD_AXISANGLE_DEGREES: 095
OS_K1POWER_DIOPTERS: 45.50
OS_K2POWER_DIOPTERS: 46.00
METHOD_AUTO_MANUAL: AUTO

## 2023-02-21 ASSESSMENT — AXIALLENGTH_DERIVED
OD_AL: 23.1135
OS_AL: 23.023
OD_AL: 23.1135

## 2023-02-21 ASSESSMENT — LID POSITION - PTOSIS: OD_PTOSIS: RUL 1+

## 2023-02-21 ASSESSMENT — TONOMETRY
OD_IOP_MMHG: 18
OS_IOP_MMHG: 14

## 2023-02-21 ASSESSMENT — REFRACTION_AUTOREFRACTION
OD_CYLINDER: -0.50
OD_SPHERE: -0.50
OS_CYLINDER: SPHR
OS_SPHERE: PLANO
OS_AXIS: 0
OD_AXIS: 014

## 2023-02-21 ASSESSMENT — CONFRONTATIONAL VISUAL FIELD TEST (CVF)
OS_FINDINGS: FULL
OD_FINDINGS: FULL

## 2023-03-21 ENCOUNTER — OFFICE (OUTPATIENT)
Dept: URBAN - METROPOLITAN AREA CLINIC 12 | Facility: CLINIC | Age: 78
Setting detail: OPHTHALMOLOGY
End: 2023-03-21
Payer: MEDICARE

## 2023-03-21 DIAGNOSIS — Z96.1: ICD-10-CM

## 2023-03-21 PROBLEM — H16.223 DRY EYE SYNDROME K SICCA; BOTH EYES: Status: ACTIVE | Noted: 2023-03-21

## 2023-03-21 PROCEDURE — 99024 POSTOP FOLLOW-UP VISIT: CPT | Performed by: OPTOMETRIST

## 2023-03-21 ASSESSMENT — REFRACTION_AUTOREFRACTION
OS_AXIS: 065
OS_SPHERE: PLANO
OD_CYLINDER: -0.25
OD_AXIS: 016
OD_SPHERE: -0.25
OS_CYLINDER: -0.25

## 2023-03-21 ASSESSMENT — SPHEQUIV_DERIVED
OD_SPHEQUIV: -0.375
OS_SPHEQUIV: -0.625
OD_SPHEQUIV: -0.75

## 2023-03-21 ASSESSMENT — REFRACTION_CURRENTRX
OD_VPRISM_DIRECTION: SV
OS_VPRISM_DIRECTION: SV
OD_SPHERE: +2.00
OS_VPRISM_DIRECTION: SV
OD_VPRISM_DIRECTION: SV
OD_OVR_VA: 20/
OS_OVR_VA: 20/
OD_CYLINDER: SPHERE
OD_AXIS: 0
OS_SPHERE: +2.00
OS_CYLINDER: SPHERE
OS_SPHERE: -1.00
OD_OVR_VA: 20/
OD_SPHERE: -1.25
OS_AXIS: 0
OS_OVR_VA: 20/

## 2023-03-21 ASSESSMENT — KERATOMETRY
OS_K1POWER_DIOPTERS: 45.50
OD_AXISANGLE_DEGREES: 091
OS_K2POWER_DIOPTERS: 45.75
OS_AXISANGLE_DEGREES: 088
OD_K1POWER_DIOPTERS: 45.50
OD_K2POWER_DIOPTERS: 46.00
METHOD_AUTO_MANUAL: AUTO

## 2023-03-21 ASSESSMENT — CONFRONTATIONAL VISUAL FIELD TEST (CVF)
OS_FINDINGS: FULL
OD_FINDINGS: FULL

## 2023-03-21 ASSESSMENT — AXIALLENGTH_DERIVED
OD_AL: 22.9306
OD_AL: 23.0695
OS_AL: 23.0668

## 2023-03-21 ASSESSMENT — REFRACTION_MANIFEST
OD_AXIS: 075
OD_VA1: 20/NI
OD_CYLINDER: -0.50
OS_VA1: 20/25
OS_CYLINDER: -0.25
OS_AXIS: 005
OD_SPHERE: -0.50
OS_SPHERE: -0.50

## 2023-03-21 ASSESSMENT — VISUAL ACUITY
OS_BCVA: 20/15-2
OD_BCVA: 20/15-

## 2023-03-21 ASSESSMENT — LID POSITION - PTOSIS: OD_PTOSIS: RUL 1+

## 2023-03-21 ASSESSMENT — SUPERFICIAL PUNCTATE KERATITIS (SPK)
OS_SPK: T
OD_SPK: T

## 2023-03-21 ASSESSMENT — TONOMETRY
OS_IOP_MMHG: 15
OD_IOP_MMHG: 12

## 2023-05-09 PROBLEM — H52.03 HYPEROPIA ; BOTH EYES: Status: ACTIVE | Noted: 2023-05-09

## 2023-05-12 ENCOUNTER — OFFICE VISIT (OUTPATIENT)
Age: 78
End: 2023-05-12

## 2023-05-12 VITALS
HEIGHT: 71 IN | HEART RATE: 52 BPM | RESPIRATION RATE: 18 BRPM | BODY MASS INDEX: 25.03 KG/M2 | TEMPERATURE: 96.4 F | DIASTOLIC BLOOD PRESSURE: 78 MMHG | OXYGEN SATURATION: 96 % | SYSTOLIC BLOOD PRESSURE: 140 MMHG | WEIGHT: 178.79 LBS
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
## 2023-05-12 DIAGNOSIS — S40.861A TICK BITE OF RIGHT UPPER ARM, INITIAL ENCOUNTER: Primary | ICD-10-CM

## 2023-05-12 RX ORDER — ALFUZOSIN HYDROCHLORIDE 10 MG/1
TABLET, EXTENDED RELEASE ORAL
COMMUNITY
Start: 2023-03-09

## 2023-05-12 RX ORDER — GABAPENTIN 400 MG/1
400 CAPSULE ORAL 3 TIMES DAILY
COMMUNITY

## 2023-05-12 RX ORDER — TADALAFIL 5 MG/1
5 TABLET ORAL DAILY
COMMUNITY
Start: 2023-03-28

## 2023-05-12 RX ORDER — ROSUVASTATIN CALCIUM 5 MG/1
TABLET, COATED ORAL
COMMUNITY
Start: 2023-04-19

## 2023-05-12 RX ORDER — DOXYCYCLINE 100 MG/1
200 TABLET ORAL ONCE
Qty: 2 TABLET | Refills: 0 | Status: SHIPPED | OUTPATIENT
Start: 2023-05-12 | End: 2023-05-12

## 2023-05-12 NOTE — PATIENT INSTRUCTIONS
Take Antibiotic prophylaxis tonight after your day of events  It will still be in the 72 hour window recommended by CDC

## 2023-05-12 NOTE — PROGRESS NOTES
[facility]        NAME: Swathi De Leon is a 69 y o  male  : 1945    MRN: [id number]  DATE: May 12, 2023  TIME: 12:35 PM    Assessment and Plan   Tick bite of right upper arm, initial encounter [S40 861A, W57  XXXA]  1  Tick bite of right upper arm, initial encounter  doxycycline (ADOXA) 100 MG tablet            Patient Instructions   Take Antibiotic prophylaxis tonight after your day of events  It will still be in the 72 hour window recommended by CDC  Follow up with PCP in 3-5 days  Proceed to ER if symptoms worsen  Chief Complaint     Chief Complaint   Patient presents with   • Tick Removal     Pt noticed tick bite on inside of R bicep yesterday  Pulled tick off but wife things there may still be some of the tick left in the arm  History of Present Illness       Had a tick along right bicep last night, believes it was on for less than 24 hours  Spouse concern that it looks like there is still a small part to the insect in his arm  Review of Systems   Review of Systems   Constitutional: Negative for chills and fever  HENT: Negative for ear pain and sore throat  Eyes: Negative for pain and visual disturbance  Respiratory: Negative for cough and shortness of breath  Cardiovascular: Negative for chest pain and palpitations  Gastrointestinal: Negative for abdominal pain and vomiting  Genitourinary: Negative for dysuria and hematuria  Musculoskeletal: Negative for arthralgias and back pain  Skin: Positive for wound  Negative for color change and rash  Neurological: Negative for seizures and syncope  All other systems reviewed and are negative          Current Medications       Current Outpatient Medications:   •  alfuzosin (UROXATRAL) 10 mg 24 hr tablet, , Disp: , Rfl:   •  doxycycline (ADOXA) 100 MG tablet, Take 2 tablets (200 mg total) by mouth 1 (one) time for 1 dose, Disp: 2 tablet, Rfl: 0  •  gabapentin (NEURONTIN) 400 mg capsule, Take 400 mg by mouth 3 "(three) times a day, Disp: , Rfl:   •  rosuvastatin (CRESTOR) 5 mg tablet, , Disp: , Rfl:   •  tadalafil (CIALIS) 5 MG tablet, Take 5 mg by mouth daily, Disp: , Rfl:     Current Allergies     Allergies as of 05/12/2023 - Reviewed 05/12/2023   Allergen Reaction Noted   • Atorvastatin Other (See Comments) 10/03/2014   • Ezetimibe Other (See Comments) 10/03/2014   • Lovastatin Other (See Comments) 10/03/2014   • Pravastatin Other (See Comments) 10/03/2014   • Simvastatin Other (See Comments) 10/03/2014   • Valsartan Other (See Comments) 10/03/2014            The following portions of the patient's history were reviewed and updated as appropriate: allergies, current medications, past family history, past medical history, past social history, past surgical history and problem list      Past Medical History:   Diagnosis Date   • Prostate enlargement        Past Surgical History:   Procedure Laterality Date   • KNEE SURGERY Right        History reviewed  No pertinent family history  Medications have been verified  Objective   /78 (BP Location: Left arm, Patient Position: Sitting, Cuff Size: Standard)   Pulse (!) 52   Temp (!) 96 4 °F (35 8 °C) (Tympanic)   Resp 18   Ht 5' 11\" (1 803 m)   Wt 81 1 kg (178 lb 12 7 oz)   SpO2 96%   BMI 24 94 kg/m²   No LMP for male patient  Physical Exam     Physical Exam  Vitals and nursing note reviewed  Constitutional:       Appearance: Normal appearance  HENT:      Nose: Nose normal       Mouth/Throat:      Mouth: Mucous membranes are moist    Eyes:      Conjunctiva/sclera: Conjunctivae normal       Pupils: Pupils are equal, round, and reactive to light  Pulmonary:      Effort: Pulmonary effort is normal    Chest:      Chest wall: No tenderness  Musculoskeletal:         General: Normal range of motion  Skin:     General: Skin is warm and dry  Capillary Refill: Capillary refill takes less than 2 seconds            Neurological:      General: No " focal deficit present  Mental Status: He is alert and oriented to person, place, and time  Mental status is at baseline     Psychiatric:         Behavior: Behavior normal

## 2023-06-21 ENCOUNTER — OFFICE (OUTPATIENT)
Dept: URBAN - METROPOLITAN AREA CLINIC 12 | Facility: CLINIC | Age: 78
Setting detail: OPHTHALMOLOGY
End: 2023-06-21
Payer: MEDICARE

## 2023-06-21 DIAGNOSIS — H16.223: ICD-10-CM

## 2023-06-21 DIAGNOSIS — H43.393: ICD-10-CM

## 2023-06-21 DIAGNOSIS — H02.401: ICD-10-CM

## 2023-06-21 DIAGNOSIS — H43.812: ICD-10-CM

## 2023-06-21 PROCEDURE — 92014 COMPRE OPH EXAM EST PT 1/>: CPT | Performed by: OPHTHALMOLOGY

## 2023-06-21 ASSESSMENT — REFRACTION_CURRENTRX
OS_SPHERE: -1.00
OS_OVR_VA: 20/
OD_SPHERE: -1.25
OD_AXIS: 0
OD_VPRISM_DIRECTION: SV
OD_OVR_VA: 20/
OD_VPRISM_DIRECTION: SV
OS_OVR_VA: 20/
OS_CYLINDER: SPHERE
OS_AXIS: 0
OS_SPHERE: +2.00
OD_SPHERE: +2.00
OD_CYLINDER: SPHERE
OS_VPRISM_DIRECTION: SV
OD_OVR_VA: 20/
OS_VPRISM_DIRECTION: SV

## 2023-06-21 ASSESSMENT — KERATOMETRY
METHOD_AUTO_MANUAL: AUTO
OS_AXISANGLE_DEGREES: 083
OS_K1POWER_DIOPTERS: 45.50
OS_K2POWER_DIOPTERS: 46.00
OD_AXISANGLE_DEGREES: 098
OD_K1POWER_DIOPTERS: 45.50
OD_K2POWER_DIOPTERS: 46.25

## 2023-06-21 ASSESSMENT — REFRACTION_MANIFEST
OS_VA1: 20/25
OD_AXIS: 075
OS_SPHERE: -0.50
OS_CYLINDER: -0.25
OD_CYLINDER: -0.50
OS_AXIS: 005
OD_SPHERE: -0.50
OD_VA1: 20/NI

## 2023-06-21 ASSESSMENT — TONOMETRY
OS_IOP_MMHG: 13
OD_IOP_MMHG: 13

## 2023-06-21 ASSESSMENT — REFRACTION_AUTOREFRACTION
OS_CYLINDER: SPHERE
OD_CYLINDER: -0.50
OD_SPHERE: PLANO
OS_SPHERE: -0.25
OD_AXIS: 021

## 2023-06-21 ASSESSMENT — SPHEQUIV_DERIVED
OS_SPHEQUIV: -0.625
OD_SPHEQUIV: -0.75

## 2023-06-21 ASSESSMENT — CONFRONTATIONAL VISUAL FIELD TEST (CVF)
OD_FINDINGS: FULL
OS_FINDINGS: FULL

## 2023-06-21 ASSESSMENT — AXIALLENGTH_DERIVED
OS_AL: 23.023
OD_AL: 23.0257

## 2023-06-21 ASSESSMENT — VISUAL ACUITY
OD_BCVA: 20/20-2
OS_BCVA: 20/20

## 2023-06-21 ASSESSMENT — LID POSITION - PTOSIS: OD_PTOSIS: RUL 1+

## 2023-06-21 ASSESSMENT — SUPERFICIAL PUNCTATE KERATITIS (SPK)
OS_SPK: T
OD_SPK: T

## 2023-10-02 ENCOUNTER — APPOINTMENT (OUTPATIENT)
Dept: UROLOGY | Facility: CLINIC | Age: 78
End: 2023-10-02
Payer: MEDICARE

## 2023-10-02 VITALS
HEIGHT: 71 IN | DIASTOLIC BLOOD PRESSURE: 82 MMHG | BODY MASS INDEX: 24.5 KG/M2 | HEART RATE: 64 BPM | OXYGEN SATURATION: 96 % | SYSTOLIC BLOOD PRESSURE: 156 MMHG | WEIGHT: 175 LBS

## 2023-10-02 PROCEDURE — 99214 OFFICE O/P EST MOD 30 MIN: CPT

## 2023-10-02 RX ORDER — ALFUZOSIN HYDROCHLORIDE 10 MG/1
10 TABLET, EXTENDED RELEASE ORAL
Qty: 90 | Refills: 1 | Status: ACTIVE | COMMUNITY
Start: 2019-11-19 | End: 1900-01-01

## 2024-04-01 ENCOUNTER — APPOINTMENT (OUTPATIENT)
Dept: UROLOGY | Facility: CLINIC | Age: 79
End: 2024-04-01
Payer: MEDICARE

## 2024-04-01 VITALS
HEIGHT: 71 IN | SYSTOLIC BLOOD PRESSURE: 169 MMHG | HEART RATE: 53 BPM | DIASTOLIC BLOOD PRESSURE: 88 MMHG | BODY MASS INDEX: 24.5 KG/M2 | WEIGHT: 175 LBS

## 2024-04-01 DIAGNOSIS — N52.9 MALE ERECTILE DYSFUNCTION, UNSPECIFIED: ICD-10-CM

## 2024-04-01 DIAGNOSIS — B35.6 TINEA CRURIS: ICD-10-CM

## 2024-04-01 DIAGNOSIS — N40.1 BENIGN PROSTATIC HYPERPLASIA WITH LOWER URINARY TRACT SYMPMS: ICD-10-CM

## 2024-04-01 DIAGNOSIS — R97.20 ELEVATED PROSTATE, SPECIFIC ANTIGEN [PSA]: ICD-10-CM

## 2024-04-01 DIAGNOSIS — N13.8 BENIGN PROSTATIC HYPERPLASIA WITH LOWER URINARY TRACT SYMPMS: ICD-10-CM

## 2024-04-01 PROCEDURE — 99215 OFFICE O/P EST HI 40 MIN: CPT

## 2024-04-01 RX ORDER — CLOTRIMAZOLE AND BETAMETHASONE DIPROPIONATE 10; .5 MG/G; MG/G
1-0.05 CREAM TOPICAL 3 TIMES DAILY
Qty: 1 | Refills: 0 | Status: ACTIVE | COMMUNITY
Start: 2024-04-01 | End: 1900-01-01

## 2024-04-01 RX ORDER — FINASTERIDE 5 MG/1
5 TABLET, FILM COATED ORAL DAILY
Qty: 90 | Refills: 1 | Status: ACTIVE | COMMUNITY
Start: 2023-10-02 | End: 1900-01-01

## 2024-04-01 NOTE — HISTORY OF PRESENT ILLNESS
[FreeTextEntry1] : 80yo M hx of BPH, Elevated PSA  He is taking alfuzosin and cialis 5mg daily.  He also started finasteride, denies much improvement with nocturia 2x. He thinks it may be due to his lack of sleep. Sometimes he takes ambien and is able to get a good night sleep without waking up  He also reports some rash on scrotum. He sees dermatologist but states cream he is applying doesn't help  11/5/18 PSA 3.0 11/5/19 PSA 8.0-patient discontinued finasteride prior to this because of decreased libido. 12/13/19 PSA 7.0 The patient was referred to Dr. Tai Salas for transperineal prostate biopsy. Dr. Salas ordered an MRI of the prostate and this was read as a PIRADS 2 so he did not recommend biopsy at this time and just suggested continuing with the PSA screening. 7/6/20 PSA 7.9; percent free PSA 14%;prostate measured 74 cc on MRI On 1/31/20 1/19/2021 PSA 8.4; percent free PSA 20%; PSA density 0.11 PSA 7.16 on 2/2022 PSA 7.21 on 6/2022 PSA 8.2 total & 24% free on 11/2022 11/2022: MRI prostate showed one PIRADS 3 lesion.  The patient was referred to Dr. Tai Salas for transperineal prostate biopsy. Dr. Salas reviewed MRI of the prostate and due to low density, he recommended against biopsy at this time PSA 9.4 total & 21% free on 8/2023 PSA 5.0 total % 14% free on 3/2024 (on finasteride)

## 2024-04-01 NOTE — ASSESSMENT
[FreeTextEntry1] : Elevated PSA: trended down - related to finasteride monitor and repeat next visit  BPH: c/w alfuzosin c/w cialis 5mg daily c.w finasteride. Will stop after 3 months if he doesn't find it helpful. He will keep a diary before he stops  ED: can double 5mg cialis for ED (max 20mg)  Tinea cruris: start lotrisone cream bid x 2 weeks

## 2024-06-19 ENCOUNTER — OFFICE (OUTPATIENT)
Dept: URBAN - METROPOLITAN AREA CLINIC 12 | Facility: CLINIC | Age: 79
Setting detail: OPHTHALMOLOGY
End: 2024-06-19
Payer: MEDICARE

## 2024-06-19 DIAGNOSIS — Z96.1: ICD-10-CM

## 2024-06-19 DIAGNOSIS — H43.393: ICD-10-CM

## 2024-06-19 DIAGNOSIS — H26.493: ICD-10-CM

## 2024-06-19 DIAGNOSIS — H16.223: ICD-10-CM

## 2024-06-19 DIAGNOSIS — H43.812: ICD-10-CM

## 2024-06-19 DIAGNOSIS — H02.401: ICD-10-CM

## 2024-06-19 PROCEDURE — 92014 COMPRE OPH EXAM EST PT 1/>: CPT | Performed by: OPHTHALMOLOGY

## 2024-06-19 ASSESSMENT — CONFRONTATIONAL VISUAL FIELD TEST (CVF)
OD_FINDINGS: FULL
OS_FINDINGS: FULL

## 2024-06-19 ASSESSMENT — LID POSITION - PTOSIS: OD_PTOSIS: RUL 1+

## 2024-07-23 ENCOUNTER — RX RENEWAL (OUTPATIENT)
Age: 79
End: 2024-07-23

## 2024-08-26 ENCOUNTER — RX RENEWAL (OUTPATIENT)
Age: 79
End: 2024-08-26

## 2024-10-07 ENCOUNTER — APPOINTMENT (OUTPATIENT)
Dept: UROLOGY | Facility: CLINIC | Age: 79
End: 2024-10-07
Payer: MEDICARE

## 2024-10-07 VITALS
HEART RATE: 69 BPM | BODY MASS INDEX: 24.41 KG/M2 | WEIGHT: 175 LBS | SYSTOLIC BLOOD PRESSURE: 152 MMHG | DIASTOLIC BLOOD PRESSURE: 72 MMHG

## 2024-10-07 DIAGNOSIS — R97.20 ELEVATED PROSTATE, SPECIFIC ANTIGEN [PSA]: ICD-10-CM

## 2024-10-07 DIAGNOSIS — N13.8 BENIGN PROSTATIC HYPERPLASIA WITH LOWER URINARY TRACT SYMPMS: ICD-10-CM

## 2024-10-07 DIAGNOSIS — N40.1 BENIGN PROSTATIC HYPERPLASIA WITH LOWER URINARY TRACT SYMPMS: ICD-10-CM

## 2024-10-07 DIAGNOSIS — N52.9 MALE ERECTILE DYSFUNCTION, UNSPECIFIED: ICD-10-CM

## 2024-10-07 PROCEDURE — 99214 OFFICE O/P EST MOD 30 MIN: CPT

## 2024-11-01 ENCOUNTER — APPOINTMENT (OUTPATIENT)
Dept: ORTHOPEDIC SURGERY | Facility: CLINIC | Age: 79
End: 2024-11-01
Payer: MEDICARE

## 2024-11-01 DIAGNOSIS — M25.511 PAIN IN RIGHT SHOULDER: ICD-10-CM

## 2024-11-01 DIAGNOSIS — M75.22 BICIPITAL TENDINITIS, LEFT SHOULDER: ICD-10-CM

## 2024-11-01 DIAGNOSIS — M75.21 BICIPITAL TENDINITIS, RIGHT SHOULDER: ICD-10-CM

## 2024-11-01 DIAGNOSIS — M19.012 PRIMARY OSTEOARTHRITIS, LEFT SHOULDER: ICD-10-CM

## 2024-11-01 DIAGNOSIS — M25.512 PAIN IN LEFT SHOULDER: ICD-10-CM

## 2024-11-01 DIAGNOSIS — M19.011 PRIMARY OSTEOARTHRITIS, RIGHT SHOULDER: ICD-10-CM

## 2024-11-01 PROCEDURE — 99204 OFFICE O/P NEW MOD 45 MIN: CPT | Mod: 25

## 2024-11-01 PROCEDURE — 73030 X-RAY EXAM OF SHOULDER: CPT | Mod: 50

## 2024-11-01 PROCEDURE — 20611 DRAIN/INJ JOINT/BURSA W/US: CPT | Mod: RT

## 2024-11-06 ENCOUNTER — NON-APPOINTMENT (OUTPATIENT)
Age: 79
End: 2024-11-06

## 2024-11-08 ENCOUNTER — NON-APPOINTMENT (OUTPATIENT)
Age: 79
End: 2024-11-08

## 2025-05-29 ENCOUNTER — APPOINTMENT (OUTPATIENT)
Dept: UROLOGY | Facility: CLINIC | Age: 80
End: 2025-05-29
Payer: MEDICARE

## 2025-05-29 VITALS
SYSTOLIC BLOOD PRESSURE: 155 MMHG | HEART RATE: 64 BPM | BODY MASS INDEX: 23.85 KG/M2 | DIASTOLIC BLOOD PRESSURE: 79 MMHG | WEIGHT: 171 LBS

## 2025-05-29 DIAGNOSIS — R97.20 ELEVATED PROSTATE, SPECIFIC ANTIGEN [PSA]: ICD-10-CM

## 2025-05-29 DIAGNOSIS — N52.9 MALE ERECTILE DYSFUNCTION, UNSPECIFIED: ICD-10-CM

## 2025-05-29 DIAGNOSIS — N13.8 BENIGN PROSTATIC HYPERPLASIA WITH LOWER URINARY TRACT SYMPMS: ICD-10-CM

## 2025-05-29 DIAGNOSIS — N40.1 BENIGN PROSTATIC HYPERPLASIA WITH LOWER URINARY TRACT SYMPMS: ICD-10-CM

## 2025-05-29 PROCEDURE — 99214 OFFICE O/P EST MOD 30 MIN: CPT

## 2025-05-29 RX ORDER — AMLODIPINE BESYLATE 5 MG/1
5 TABLET ORAL
Refills: 0 | Status: ACTIVE | COMMUNITY
Start: 2025-05-29

## 2025-06-18 ENCOUNTER — OFFICE (OUTPATIENT)
Dept: URBAN - METROPOLITAN AREA CLINIC 12 | Facility: CLINIC | Age: 80
Setting detail: OPHTHALMOLOGY
End: 2025-06-18
Payer: MEDICARE

## 2025-06-18 DIAGNOSIS — H02.401: ICD-10-CM

## 2025-06-18 DIAGNOSIS — H26.493: ICD-10-CM

## 2025-06-18 DIAGNOSIS — H16.223: ICD-10-CM

## 2025-06-18 DIAGNOSIS — Z96.1: ICD-10-CM

## 2025-06-18 DIAGNOSIS — H43.393: ICD-10-CM

## 2025-06-18 DIAGNOSIS — H43.812: ICD-10-CM

## 2025-06-18 PROCEDURE — 92014 COMPRE OPH EXAM EST PT 1/>: CPT | Performed by: OPHTHALMOLOGY

## 2025-06-18 ASSESSMENT — REFRACTION_MANIFEST
OS_CYLINDER: -0.50
OD_SPHERE: +0.50
OS_AXIS: 073
OD_AXIS: 038
OD_CYLINDER: -0.50
OS_SPHERE: +0.25

## 2025-06-18 ASSESSMENT — REFRACTION_AUTOREFRACTION
OS_CYLINDER: -0.50
OS_SPHERE: +0.25
OD_SPHERE: +0.50
OD_CYLINDER: -0.50
OS_AXIS: 073
OD_AXIS: 038

## 2025-06-18 ASSESSMENT — REFRACTION_CURRENTRX
OS_SPHERE: -1.00
OD_OVR_VA: 20/
OD_VPRISM_DIRECTION: SV
OD_SPHERE: -1.25
OS_OVR_VA: 20/
OD_SPHERE: +2.00
OD_AXIS: 0
OS_VPRISM_DIRECTION: SV
OS_AXIS: 0
OD_CYLINDER: SPHERE
OD_OVR_VA: 20/
OD_VPRISM_DIRECTION: SV
OS_VPRISM_DIRECTION: SV
OS_OVR_VA: 20/
OS_CYLINDER: SPHERE
OS_SPHERE: +2.00

## 2025-06-18 ASSESSMENT — KERATOMETRY
OD_AXISANGLE_DEGREES: 109
OD_K1POWER_DIOPTERS: 45.25
OS_K2POWER_DIOPTERS: 46.00
OS_AXISANGLE_DEGREES: 080
METHOD_AUTO_MANUAL: AUTO
OD_K2POWER_DIOPTERS: 45.75
OS_K1POWER_DIOPTERS: 45.50

## 2025-06-18 ASSESSMENT — VISUAL ACUITY
OS_BCVA: 20/20-2
OD_BCVA: 20/20-1

## 2025-06-18 ASSESSMENT — TONOMETRY
OD_IOP_MMHG: 14
OS_IOP_MMHG: 14

## 2025-06-18 ASSESSMENT — CONFRONTATIONAL VISUAL FIELD TEST (CVF)
OD_FINDINGS: FULL
OS_FINDINGS: FULL

## 2025-06-18 ASSESSMENT — SUPERFICIAL PUNCTATE KERATITIS (SPK)
OS_SPK: T
OD_SPK: T

## 2025-06-18 ASSESSMENT — LID POSITION - PTOSIS: OD_PTOSIS: RUL 1+

## 2025-08-04 ENCOUNTER — RX RENEWAL (OUTPATIENT)
Age: 80
End: 2025-08-04

## 2025-08-28 ENCOUNTER — APPOINTMENT (OUTPATIENT)
Dept: UROLOGY | Facility: CLINIC | Age: 80
End: 2025-08-28
Payer: MEDICARE

## 2025-08-28 VITALS
DIASTOLIC BLOOD PRESSURE: 84 MMHG | BODY MASS INDEX: 24.22 KG/M2 | HEIGHT: 71 IN | HEART RATE: 65 BPM | WEIGHT: 173 LBS | SYSTOLIC BLOOD PRESSURE: 132 MMHG | RESPIRATION RATE: 16 BRPM

## 2025-08-28 DIAGNOSIS — N40.1 BENIGN PROSTATIC HYPERPLASIA WITH LOWER URINARY TRACT SYMPMS: ICD-10-CM

## 2025-08-28 DIAGNOSIS — N52.9 MALE ERECTILE DYSFUNCTION, UNSPECIFIED: ICD-10-CM

## 2025-08-28 DIAGNOSIS — R97.20 ELEVATED PROSTATE, SPECIFIC ANTIGEN [PSA]: ICD-10-CM

## 2025-08-28 DIAGNOSIS — N13.8 BENIGN PROSTATIC HYPERPLASIA WITH LOWER URINARY TRACT SYMPMS: ICD-10-CM

## 2025-08-28 PROCEDURE — 99214 OFFICE O/P EST MOD 30 MIN: CPT
